# Patient Record
Sex: FEMALE | Race: ASIAN | Employment: FULL TIME | URBAN - METROPOLITAN AREA
[De-identification: names, ages, dates, MRNs, and addresses within clinical notes are randomized per-mention and may not be internally consistent; named-entity substitution may affect disease eponyms.]

---

## 2017-06-01 ENCOUNTER — TRANSFERRED RECORDS (OUTPATIENT)
Dept: HEALTH INFORMATION MANAGEMENT | Facility: CLINIC | Age: 32
End: 2017-06-01

## 2017-06-01 LAB — PAP SMEAR - HIM PATIENT REPORTED: NEGATIVE

## 2018-01-23 ENCOUNTER — OFFICE VISIT (OUTPATIENT)
Dept: FAMILY MEDICINE | Facility: CLINIC | Age: 33
End: 2018-01-23
Payer: COMMERCIAL

## 2018-01-23 VITALS
WEIGHT: 119 LBS | SYSTOLIC BLOOD PRESSURE: 104 MMHG | DIASTOLIC BLOOD PRESSURE: 68 MMHG | BODY MASS INDEX: 21.09 KG/M2 | RESPIRATION RATE: 16 BRPM | HEART RATE: 71 BPM | HEIGHT: 63 IN | OXYGEN SATURATION: 100 %

## 2018-01-23 DIAGNOSIS — D50.8 IRON DEFICIENCY ANEMIA SECONDARY TO INADEQUATE DIETARY IRON INTAKE: Primary | ICD-10-CM

## 2018-01-23 DIAGNOSIS — N97.9 FEMALE INFERTILITY: ICD-10-CM

## 2018-01-23 LAB — HGB BLD-MCNC: 11.1 G/DL (ref 11.7–15.7)

## 2018-01-23 PROCEDURE — 84443 ASSAY THYROID STIM HORMONE: CPT | Performed by: FAMILY MEDICINE

## 2018-01-23 PROCEDURE — 85018 HEMOGLOBIN: CPT | Performed by: FAMILY MEDICINE

## 2018-01-23 PROCEDURE — 36415 COLL VENOUS BLD VENIPUNCTURE: CPT | Performed by: FAMILY MEDICINE

## 2018-01-23 PROCEDURE — 99202 OFFICE O/P NEW SF 15 MIN: CPT | Performed by: FAMILY MEDICINE

## 2018-01-23 RX ORDER — PRENATAL VIT/IRON FUM/FOLIC AC 27MG-0.8MG
1 TABLET ORAL DAILY
COMMUNITY

## 2018-01-23 NOTE — LETTER
January 26, 2018      Ganesh Phillip  [address] [address]        Dear ,    We are writing to inform you of your test results.    All of your lab results are normal, except as noted below.     The following are explanations of some of our lab tests     THIS DOES NOT MEAN THAT YOU HAD ALL OF THESE DONE     Please continue on the same medications unless a change is noted above     These are some general explanations for tests:     Hgb is the blood iron level -- very slightly low     ### Please eat iron in diet : beets, molasses ,  red meat and greens eg spinach     WBC means White Blood Cells   Platelets are small blood cells that help with forming the blood clots along with other blood factors.   Electrolytes are Sodium, Potassium, Calcium, Magnesium, Phosphorus.   Liver tests are: AST, ALT, Bilirubin, Alkaline Phosphatase.   Kidney tests are Creatinine, GFR.   HDL Cholesterol - is the good cholesterol and it is good to have it high.   LDL cholesterol is the bad cholesterol and it is good to have it low.   It is recommended to have LDL less than 130 for people with hypertension and to have it less than 100 for people with heart disease, diabetes and chronic kidney disease.   Triglycerides are another type of lipid and can also cause heart disease so should be kept low.   Thyroid tests are TSH, T4, T3   Glucose is sugar   A1c is a test that gives us an idea about how well was controlled the diabetes for the last 3 months.   PSA stands for Prostate Specific Antigen and it can be elevated with prostate cancer or prostate inflammation.     Resulted Orders   TSH with free T4 reflex   Result Value Ref Range    TSH 1.27 0.40 - 4.00 mU/L   Hemoglobin   Result Value Ref Range    Hemoglobin 11.1 (L) 11.7 - 15.7 g/dL       If you have any questions or concerns, please call the clinic at the number listed above.       Sincerely,        Flor Meek MD

## 2018-01-23 NOTE — Clinical Note
Please abstract the following data from this visit with this patient into the appropriate field in Epic:  Pap smear done on this date: 6/2017 (approximately), by this group: ?, results were normal/negative.

## 2018-01-23 NOTE — PATIENT INSTRUCTIONS
1. Print out Basal Body Temperature Graphs     Use a regular mouth thermometer     Bring in 2 mo to me for eval

## 2018-01-23 NOTE — PROGRESS NOTES
SUBJECTIVE:   Ganesh Phillip is a 32 year old female who presents to clinic today for the following health issues:      INFERTILITY       Duration: 3  Years WITHOUT bc     mp Q 28-30 d     Short MP x 1-2 d    Description (location/character/radiation): pt has abnormal periods and would like to get pregnant    Intensity:  moderate    Accompanying signs and symptoms: none    History (similar episodes/previous evaluation): None    Precipitating or alleviating factors: None    Therapies tried and outcome: None    Took Plan B  X 5 times 2009 to 2014      in 2012 and no BC except as above     No prior work up      no hx of testicular problems / DISEASE,/ trauma   No hx of father ing a child    for 5 yrs without BC          Medication Followup of Anemia      Taking Medication as prescribed: yes: one iron pill a day  + prenatal vitamins     Side Effects:  None    Medication Helping Symptoms:  yes         Problem list and histories reviewed & adjusted, as indicated.  Additional history: as documented    Labs reviewed in EPIC    Reviewed and updated as needed this visit by clinical staff     Reviewed and updated as needed this visit by Provider         ROS:  C: NEGATIVE for fever, chills, change in weight  I: NEGATIVE for worrisome rashes, moles or lesions  E: NEGATIVE for vision changes or irritation  E/M: NEGATIVE for ear, mouth and throat problems  R: NEGATIVE for significant cough or SOB  B: NEGATIVE for masses, tenderness or discharge  CV: NEGATIVE for chest pain, palpitations or peripheral edema  GI: NEGATIVE for nausea, abdominal pain, heartburn, or change in bowel habits  : NEGATIVE for frequency, dysuria, or hematuria  M: NEGATIVE for significant arthralgias or myalgia  N: NEGATIVE for weakness, dizziness or paresthesias  E: NEGATIVE for temperature intolerance, skin/hair changes  H: NEGATIVE for bleeding problems  P: NEGATIVE for changes in mood or affect    OBJECTIVE:     /68  Pulse 71  " Resp 16  Ht 5' 2.5\" (1.588 m)  Wt 119 lb (54 kg)  SpO2 100%  BMI 21.42 kg/m2  Body mass index is 21.42 kg/(m^2).  GENERAL: healthy, alert and no distress  EYES: Eyes grossly normal to inspection, PERRL and conjunctivae and sclerae normal  RESP: lungs clear to auscultation - no rales, rhonchi or wheezes  CV: regular rate and rhythm, normal S1 S2, no S3 or S4, no murmur, click or rub, no peripheral edema and peripheral pulses strong  MS: no gross musculoskeletal defects noted, no edema  SKIN: no suspicious lesions or rashes  NEURO: Normal strength and tone, mentation intact and speech normal  PSYCH: mentation appears normal, affect normal/bright    Diagnostic Test Results:  Results for orders placed or performed in visit on 01/23/18   Hemoglobin   Result Value Ref Range    Hemoglobin 11.1 (L) 11.7 - 15.7 g/dL       ASSESSMENT/PLAN:               ICD-10-CM    1. Iron deficiency anemia secondary to inadequate dietary iron intake D50.8 Ferrous Sulfate (IRON SUPPLEMENT PO)     Prenatal Vit-Fe Fumarate-FA (PRENATAL MULTIVITAMIN PLUS IRON) 27-0.8 MG TABS per tablet     Multiple Vitamin (MULTI VITAMIN PO)     TSH with free T4 reflex     Hemoglobin   2. Female infertility N97.9 TSH with free T4 reflex       Patient Instructions   1. Print out Basal Body Temperature Graphs     Use a regular mouth thermometer     Bring in 2 mo to me for eval     Time spent with the patient 23mins, more than 50% in counseling and coordinating care, Re above medical problems.      I  Explained the treatment and the reason for it  To pt and elsy +  Flor Meek MD  [facility]  "

## 2018-01-23 NOTE — NURSING NOTE
"Chief Complaint   Patient presents with     Abnormal Bleeding Problem       Initial /68  Pulse 71  Resp 16  Ht 5' 2.5\" (1.588 m)  Wt 119 lb (54 kg)  SpO2 100%  BMI 21.42 kg/m2 Estimated body mass index is 21.42 kg/(m^2) as calculated from the following:    Height as of this encounter: 5' 2.5\" (1.588 m).    Weight as of this encounter: 119 lb (54 kg).  Medication Reconciliation: lis German CMA      "

## 2018-01-23 NOTE — MR AVS SNAPSHOT
"              After Visit Summary   1/23/2018    Ganesh Phillip    MRN: [id number]           Patient Information     Date Of Birth          1985        Visit Information        Provider Department      1/23/2018 1:40 PM Flor Meek MD [facility]        Care Instructions    1. Print out Basal Body Temperature Graphs     Use a regular mouth thermometer     Bring in 2 mo to me for eval           Follow-ups after your visit        Follow-up notes from your care team     Return in about 3 months (around 4/23/2018).      Who to contact     If you have questions or need follow up information about today's clinic visit or your schedule please contact [facility] directly at [phone].  Normal or non-critical lab and imaging results will be communicated to you by MyChart, letter or phone within 4 business days after the clinic has received the results. If you do not hear from us within 7 days, please contact the clinic through MyChart or phone. If you have a critical or abnormal lab result, we will notify you by phone as soon as possible.  Submit refill requests through PlanZap or call your pharmacy and they will forward the refill request to us. Please allow 3 business days for your refill to be completed.          Additional Information About Your Visit        MyChart Information     PlanZap lets you send messages to your doctor, view your test results, renew your prescriptions, schedule appointments and more. To sign up, go to [email or web address] . Click on \"Log in\" on the left side of the screen, which will take you to the Welcome page. Then click on \"Sign up Now\" on the right side of the page.     You will be asked to enter the access code listed below, as well as some personal information. Please follow the directions to create your username and password.     Your access code is: [email or web address]  Expires: 4/23/2018  2:50 PM     Your access " "code will  in 90 days. If you need help or a new code, please call your [place] clinic or [phone].        Care EveryWhere ID     This is your Care EveryWhere ID. This could be used by other organizations to access your [place] medical records  [id number]        Your Vitals Were     Pulse Respirations Height Pulse Oximetry BMI (Body Mass Index)       71 16 5' 2.5\" (1.588 m) 100% 21.42 kg/m2        Blood Pressure from Last 3 Encounters:   18 104/68    Weight from Last 3 Encounters:   18 119 lb (54 kg)              Today, you had the following     No orders found for display       Primary Care Provider Office Phone # Fax #    [facility] [phone]       [address] [facility] [address]        Equal Access to Services     HANNAH STEPHEN : Hadii aad ku hadasho Soomaali, waaxda luqadaha, qaybta kaalmada adeegyada, waxay idiin hayaan adeeg david titus . So [facility] [phone].    ATENCIÓN: Si habla español, tiene a hilario disposición servicios gratuitos de asistencia lingüística. Llame al [phone].    We comply with applicable federal civil rights laws and [place] laws. We do not discriminate on the basis of race, color, national origin, age, disability, sex, sexual orientation, or gender identity.            Thank you!     Thank you for choosing [facility]  for your care. Our goal is always to provide you with excellent care. Hearing back from our patients is one way we can continue to improve our services. Please take a few minutes to complete the written survey that you may receive in the mail after your visit with us. Thank you!             Your Updated Medication List - Protect others around you: Learn how to safely use, store and throw away your medicines at www.disposemymeds.org.          This list is accurate as of: 18  2:50 PM.  Always use your most recent med list.                   Brand Name " Dispense Instructions for use Diagnosis    IRON SUPPLEMENT PO           MULTI VITAMIN PO           prenatal multivitamin plus iron 27-0.8 MG Tabs per tablet      Take 1 tablet by mouth daily

## 2018-01-24 LAB — TSH SERPL DL<=0.005 MIU/L-ACNC: 1.27 MU/L (ref 0.4–4)

## 2018-01-25 NOTE — PROGRESS NOTES
Order(s) created erroneously. Erroneous order ID: [id number]   Order canceled by: NORA VARGHESE   Order cancel date/time: 01/25/2018 1:58 PM

## 2018-01-26 NOTE — PROGRESS NOTES
Please see attached lab results  All of your lab results are normal, except as noted below.    The following are explanations of some of our lab tests    THIS DOES NOT MEAN THAT YOU HAD ALL OF THESE DONE    Please continue on the same medications unless a change is noted above    These are some general explanations for tests:    Hgb is the blood iron level -- very slightly low    ### Please eat iron in diet : beets, molasses ,  red meat and greens eg spinach     WBC means White Blood Cells  Platelets are small blood cells that help with forming the blood clots along with other blood factors.  Electrolytes are Sodium, Potassium, Calcium, Magnesium, Phosphorus.  Liver tests are: AST, ALT, Bilirubin, Alkaline Phosphatase.  Kidney tests are Creatinine, GFR.  HDL Cholesterol - is the good cholesterol and it is good to have it high.  LDL cholesterol is the bad cholesterol and it is good to have it low.  It is recommended to have LDL less than 130 for people with hypertension and to have it less than 100 for people with heart disease, diabetes and chronic kidney disease.  Triglycerides are another type of lipid and can also cause heart disease so should be kept low.   Thyroid tests are TSH, T4, T3  Glucose is sugar  A1c is a test that gives us an idea about how well was controlled the diabetes for the last 3 months.   PSA stands for Prostate Specific Antigen and it can be elevated with prostate cancer or prostate inflammation.

## 2018-03-27 ENCOUNTER — OFFICE VISIT (OUTPATIENT)
Dept: FAMILY MEDICINE | Facility: CLINIC | Age: 33
End: 2018-03-27
Payer: COMMERCIAL

## 2018-03-27 VITALS
DIASTOLIC BLOOD PRESSURE: 68 MMHG | WEIGHT: 114 LBS | HEART RATE: 69 BPM | OXYGEN SATURATION: 97 % | SYSTOLIC BLOOD PRESSURE: 110 MMHG | BODY MASS INDEX: 20.52 KG/M2 | RESPIRATION RATE: 16 BRPM

## 2018-03-27 DIAGNOSIS — D64.9 ANEMIA, UNSPECIFIED TYPE: ICD-10-CM

## 2018-03-27 DIAGNOSIS — N97.9 INFERTILITY, FEMALE, PRIMARY: Primary | ICD-10-CM

## 2018-03-27 PROCEDURE — 99214 OFFICE O/P EST MOD 30 MIN: CPT | Performed by: FAMILY MEDICINE

## 2018-03-27 NOTE — NURSING NOTE
"Chief Complaint   Patient presents with     Results       Initial /68  Pulse 69  Resp 16  Wt 114 lb (51.7 kg)  SpO2 97%  BMI 20.52 kg/m2 Estimated body mass index is 20.52 kg/(m^2) as calculated from the following:    Height as of 1/23/18: 5' 2.5\" (1.588 m).    Weight as of this encounter: 114 lb (51.7 kg).  Medication Reconciliation: complete   MELISA German CMA      "

## 2018-03-27 NOTE — PATIENT INSTRUCTIONS
1.  Please eat iron in diet : beets, molasses ,  red meat and greens eg spinach     2. Take one iron tab a day    3. [facility]  [phone]    [facility]  [facility]     [facility]. [facility]  [phone]    Need a sperm count for infertility work up

## 2018-03-27 NOTE — MR AVS SNAPSHOT
"              After Visit Summary   3/27/2018    Ganesh Phillip    MRN: [id number]           Patient Information     Date Of Birth          1985        Visit Information        Provider Department      3/27/2018 7:20 AM Flor Meek MD [facility]        Care Instructions    1.  Please eat iron in diet : beets, molasses ,  red meat and greens eg spinach     2. Take one iron tab a day    3. [facility]  [phone]    [facility]  [facility]     4. [facility]  [phone]    Need a sperm count for infertility work up           Follow-ups after your visit        Follow-up notes from your care team     Return in about 2 months (around 5/27/2018).      Who to contact     If you have questions or need follow up information about today's clinic visit or your schedule please contact [facility] directly at [phone].  Normal or non-critical lab and imaging results will be communicated to you by iKoahart, letter or phone within 4 business days after the clinic has received the results. If you do not hear from us within 7 days, please contact the clinic through iKoahart or phone. If you have a critical or abnormal lab result, we will notify you by phone as soon as possible.  Submit refill requests through Contractually or call your pharmacy and they will forward the refill request to us. Please allow 3 business days for your refill to be completed.          Additional Information About Your Visit        iKoahart Information     Contractually lets you send messages to your doctor, view your test results, renew your prescriptions, schedule appointments and more. To sign up, go to [email or web address] . Click on \"Log in\" on the left side of the screen, which will take you to the Welcome page. Then click on \"Sign up Now\" on the right side of the page.     You will be asked to enter the access code listed below, as well as some personal information. " Please follow the directions to create your username and password.     Your access code is: [email or web address]  Expires: 2018  3:50 PM     Your access code will  in 90 days. If you need help or a new code, please call your [place] clinic or [phone].        Care EveryWhere ID     This is your Care EveryWhere ID. This could be used by other organizations to access your [place] medical records  [id number]        Your Vitals Were     Pulse Respirations Pulse Oximetry BMI (Body Mass Index)          69 16 97% 20.52 kg/m2         Blood Pressure from Last 3 Encounters:   18 110/68   18 104/68    Weight from Last 3 Encounters:   18 114 lb (51.7 kg)   18 119 lb (54 kg)              Today, you had the following     No orders found for display       Primary Care Provider Office Phone # Fax #    [facility] [phone] [address] [facility] [address]        Equal Access to Services     HANNAH STEPHEN : Hadii aad ku hadasho Soomaali, waaxda luqadaha, qaybta kaalmada adeegyada, waxay corneliain hayevaristo titus . So [facility] [phone].    ATENCIÓN: Si habla español, tiene a hilario disposición servicios gratuitos de asistencia lingüística. Valarie al [phone].    We comply with applicable federal civil rights laws and [place] laws. We do not discriminate on the basis of race, color, national origin, age, disability, sex, sexual orientation, or gender identity.            Thank you!     Thank you for choosing [facility]  for your care. Our goal is always to provide you with excellent care. Hearing back from our patients is one way we can continue to improve our services. Please take a few minutes to complete the written survey that you may receive in the mail after your visit with us. Thank you!             Your Updated Medication List - Protect others around you: Learn how to safely use, store and  throw away your medicines at www.disposemymeds.org.          This list is accurate as of 3/27/18  8:02 AM.  Always use your most recent med list.                   Brand Name Dispense Instructions for use Diagnosis    IRON SUPPLEMENT PO       Iron deficiency anemia secondary to inadequate dietary iron intake       MULTI VITAMIN PO       Iron deficiency anemia secondary to inadequate dietary iron intake       prenatal multivitamin plus iron 27-0.8 MG Tabs per tablet      Take 1 tablet by mouth daily    Iron deficiency anemia secondary to inadequate dietary iron intake

## 2018-03-27 NOTE — PROGRESS NOTES
SUBJECTIVE:   Ganesh Phillip is a 32 year old female who presents to clinic today for the following health issues:      Infertility       Duration: 1see last note     They are trying to get pregnant     Description (location/character/radiation): pt is here to follow up on lab results from January.- 2.5 mo of BBTS but no note of sex and not graphed --just listed --does have a significant temp rise 2 x  At 13 d prior to next MP     Intensity:      Accompanying signs and symptoms:     History (similar episodes/previous evaluation): None    Precipitating or alleviating factors: None    Therapies tried and outcome: None     has never fathered a child        Medication Followup of Anemia     Taking Medication as prescribed: yes one FE tab a d    Side Effects:  None    Medication Helping Symptoms:  Yes    Hgb = 11.3        Problem list and histories reviewed & adjusted, as indicated.  Additional history: as documented    Labs reviewed in EPIC    Reviewed and updated as needed this visit by clinical staff  Tobacco  Allergies  Meds  Med Hx  Surg Hx  Fam Hx  Soc Hx      Reviewed and updated as needed this visit by Provider         ROS:  CONSTITUTIONAL: NEGATIVE for fever, chills, change in weight  INTEGUMENTARY/SKIN: NEGATIVE for worrisome rashes, moles or lesions  EYES: NEGATIVE for vision changes or irritation  ENT/MOUTH: NEGATIVE for ear, mouth and throat problems  RESP: NEGATIVE for significant cough or SOB  BREAST: NEGATIVE for masses, tenderness or discharge  CV: NEGATIVE for chest pain, palpitations or peripheral edema  GI: NEGATIVE for nausea, abdominal pain, heartburn, or change in bowel habits  : NEGATIVE for frequency, dysuria, or hematuria  MUSCULOSKELETAL: NEGATIVE for significant arthralgias or myalgia  NEURO: NEGATIVE for weakness, dizziness or paresthesias  ENDOCRINE: NEGATIVE for temperature intolerance, skin/hair changes  HEME: NEGATIVE for bleeding problems  PSYCHIATRIC: NEGATIVE for changes  in mood or affect    OBJECTIVE:     /68  Pulse 69  Resp 16  Wt 114 lb (51.7 kg)  SpO2 97%  BMI 20.52 kg/m2  Body mass index is 20.52 kg/(m^2).  GENERAL: healthy, alert and no distress  EYES: Eyes grossly normal to inspection, PERRL and conjunctivae and sclerae normal  RESP: lungs clear to auscultation - no rales, rhonchi or wheezes  MS: no gross musculoskeletal defects noted, no edema  SKIN: no suspicious lesions or rashes  NEURO: Normal strength and tone, mentation intact and speech normal  PSYCH: mentation appears normal, affect normal/bright    Diagnostic Test Results:  No results found for this or any previous visit (from the past 24 hour(s)).    ASSESSMENT/PLAN:               ICD-10-CM    1. Infertility, female, primary N97.9    2. Anemia, unspecified type D64.9        Patient Instructions   1.  Please eat iron in diet : beets, molasses ,  red meat and greens eg spinach     2. Take one iron tab a day    3. [facility]  [phone]    [facility] Jain     [facility]. [facility]  [phone]    Need a sperm count for infertility work up     Time spent with the patient 28mins, more than 50% in counseling and coordinating care, Re above medical problems.      Discussed al with pt thoroughly and how to proceed   Flor Meek MD  [facility]

## 2022-03-22 ENCOUNTER — HOSPITAL ENCOUNTER (OUTPATIENT)
Dept: RADIOLOGY | Facility: CLINIC | Age: 37
Discharge: HOME | End: 2022-03-22
Attending: NURSE PRACTITIONER | Admitting: RADIOLOGY
Payer: COMMERCIAL

## 2022-03-22 DIAGNOSIS — N97.9 INFERTILITY, FEMALE: ICD-10-CM

## 2022-03-22 PROCEDURE — 58340 CATHETER FOR HYSTEROGRAPHY: CPT

## 2022-03-22 PROCEDURE — 74740 X-RAY FEMALE GENITAL TRACT: CPT

## 2022-03-22 PROCEDURE — 255N000002 HC RX 255 OP 636

## 2022-03-22 RX ORDER — IOPAMIDOL 612 MG/ML
9 INJECTION, SOLUTION INTRAVASCULAR ONCE
Status: COMPLETED | OUTPATIENT
Start: 2022-03-22 | End: 2022-03-22

## 2022-03-22 RX ADMIN — IOPAMIDOL 9 ML: 612 INJECTION, SOLUTION INTRAVENOUS at 09:56
